# Patient Record
Sex: FEMALE | Race: WHITE
[De-identification: names, ages, dates, MRNs, and addresses within clinical notes are randomized per-mention and may not be internally consistent; named-entity substitution may affect disease eponyms.]

---

## 2023-04-04 PROBLEM — Z00.00 ENCOUNTER FOR PREVENTIVE HEALTH EXAMINATION: Status: ACTIVE | Noted: 2023-04-04

## 2023-04-12 ENCOUNTER — APPOINTMENT (OUTPATIENT)
Dept: ORTHOPEDIC SURGERY | Facility: CLINIC | Age: 68
End: 2023-04-12
Payer: MEDICARE

## 2023-04-12 DIAGNOSIS — E78.00 PURE HYPERCHOLESTEROLEMIA, UNSPECIFIED: ICD-10-CM

## 2023-04-12 DIAGNOSIS — M51.36 OTHER INTERVERTEBRAL DISC DEGENERATION, LUMBAR REGION: ICD-10-CM

## 2023-04-12 DIAGNOSIS — Z90.710 ACQUIRED ABSENCE OF BOTH CERVIX AND UTERUS: ICD-10-CM

## 2023-04-12 DIAGNOSIS — M41.55 OTHER SECONDARY SCOLIOSIS, THORACOLUMBAR REGION: ICD-10-CM

## 2023-04-12 DIAGNOSIS — I10 ESSENTIAL (PRIMARY) HYPERTENSION: ICD-10-CM

## 2023-04-12 PROCEDURE — 72110 X-RAY EXAM L-2 SPINE 4/>VWS: CPT

## 2023-04-12 PROCEDURE — 72170 X-RAY EXAM OF PELVIS: CPT

## 2023-04-12 PROCEDURE — 99204 OFFICE O/P NEW MOD 45 MIN: CPT

## 2023-04-12 NOTE — DISCUSSION/SUMMARY
[de-identified] : reviewed the case/imaging \par degnerative changes noted on the imaging \par lumbar spondylosis with radiculopathy \par indicated for MRi L spine \par fu the mri

## 2023-04-12 NOTE — HISTORY OF PRESENT ILLNESS
[Lower back] : lower back [Gradual] : gradual [Sudden] : sudden [7] : 7 [6] : 6 [Burning] : burning [Shooting] : shooting [Stabbing] : stabbing [Intermittent] : intermittent [Rest] : rest [Exercising] : exercising [Retired] : Work status: retired [de-identified] : 4/12/23: 67yo RHD F with longstanding bilateral lumbar pain for many years with no injury. distant injuries -  Most bothersome with prolonged sitting and prolonged standing. \par \par No recent formal tx to date. \par Was previously seen for this issue by Dr. Gomez in 2019. \par \par Admits to radic down both legs and N/T of bilateral feet. Some weakness \par Denies b/b incontinence.\par distant LESI in 2009\par Has tried chiro care, PT and epidural in the past\par No recent formal tx\par No prior surgery/acupuncture\par nO RECENT pain management \par \par PMHx: HTN, hyperlipidemia, gerd \par Denies cancer hx\par \par Xrays today:\par L spine - lumbar scoliosis, spondylosis (constant from recent abdominal exam)\par AP pelvis - negative \par \par Occupation:  \par working from home now  [] : Post Surgical Visit: no [FreeTextEntry1] : l spine  [FreeTextEntry5] : Pt has a hx of l spine issues, pt has numbness and tingling down into both feet, pt has pain with sitting for long periods of inactivity as well as pain when sleeping  [FreeTextEntry7] : down into both feet  [de-identified] : xrays mris  [de-identified] : none  [de-identified] : 2009

## 2023-04-17 ENCOUNTER — RESULT REVIEW (OUTPATIENT)
Age: 68
End: 2023-04-17

## 2023-05-10 ENCOUNTER — APPOINTMENT (OUTPATIENT)
Dept: ORTHOPEDIC SURGERY | Facility: CLINIC | Age: 68
End: 2023-05-10
Payer: MEDICARE

## 2023-05-10 DIAGNOSIS — M54.16 RADICULOPATHY, LUMBAR REGION: ICD-10-CM

## 2023-05-10 PROCEDURE — 99214 OFFICE O/P EST MOD 30 MIN: CPT

## 2023-05-10 NOTE — DISCUSSION/SUMMARY
[de-identified] : reviewed the MRi with patient \par questions answered \par severe stenosis with spondylolisthesis \par would be a candidate for l2-S1 lami/fusoin \par we discussed this would be a big surgery \par we discussed the surgery \par \par will refer to pain management to try an injection and also rec PT

## 2023-05-10 NOTE — HISTORY OF PRESENT ILLNESS
[Lower back] : lower back [Gradual] : gradual [Sudden] : sudden [7] : 7 [6] : 6 [Burning] : burning [Shooting] : shooting [Stabbing] : stabbing [Intermittent] : intermittent [Rest] : rest [Exercising] : exercising [Retired] : Work status: retired [de-identified] : 4/12/23: 67yo RHD F with longstanding bilateral lumbar pain for many years with no injury. distant injuries -  Most bothersome with prolonged sitting and prolonged standing. \par \par No recent formal tx to date. \par Was previously seen for this issue by Dr. Gomez in 2019. \par \par Admits to radic down both legs and N/T of bilateral feet. Some weakness \par Denies b/b incontinence.\par distant LESI in 2009\par Has tried chiro care, PT and epidural in the past\par No recent formal tx\par No prior surgery/acupuncture\par nO RECENT pain management \par \par PMHx: HTN, hyperlipidemia, gerd \par Denies cancer hx\par \par Xrays today:\par L spine - lumbar scoliosis, spondylosis (constant from recent abdominal exam)\par AP pelvis - negative \par \par Occupation:  \par working from home now \par \par 5/10/23: Here for fu - plan at last was "reviewed the case/imaging \par degnerative changes noted on the imaging \par lumbar spondylosis with radiculopathy \par indicated for MRi L spine \par fu the mri" - overall doing about the same as before - pain continues in the back - numbness in the legs \par \par MRi L spine - \par Mild lumbar dextroscoliosis.\par Advanced multilevel spondylosis with multiple levels of moderate to severe\par spinal canal stenosis most pronounced at L4-L5.\par Severe bilateral neural foraminal narrowing throughout the lumbar spine as\par described.\par \par  [] : Post Surgical Visit: no [FreeTextEntry1] : l spine  [FreeTextEntry7] : down into both feet  [de-identified] : xrays MRI  [de-identified] : none  [de-identified] : 2009

## 2024-10-07 ENCOUNTER — APPOINTMENT (OUTPATIENT)
Dept: OTOLARYNGOLOGY | Facility: CLINIC | Age: 69
End: 2024-10-07
Payer: MEDICARE

## 2024-10-07 VITALS
SYSTOLIC BLOOD PRESSURE: 123 MMHG | HEART RATE: 81 BPM | BODY MASS INDEX: 21.36 KG/M2 | WEIGHT: 99 LBS | DIASTOLIC BLOOD PRESSURE: 73 MMHG | HEIGHT: 57 IN

## 2024-10-07 DIAGNOSIS — Z83.438 FAMILY HISTORY OF OTHER DISORDER OF LIPOPROTEIN METABOLISM AND OTHER LIPIDEMIA: ICD-10-CM

## 2024-10-07 DIAGNOSIS — Z72.0 TOBACCO USE: ICD-10-CM

## 2024-10-07 DIAGNOSIS — H61.21 IMPACTED CERUMEN, RIGHT EAR: ICD-10-CM

## 2024-10-07 DIAGNOSIS — Z82.49 FAMILY HISTORY OF ISCHEMIC HEART DISEASE AND OTHER DISEASES OF THE CIRCULATORY SYSTEM: ICD-10-CM

## 2024-10-07 PROCEDURE — 99202 OFFICE O/P NEW SF 15 MIN: CPT

## 2024-10-07 RX ORDER — OLMESARTAN MEDOXOMIL 40 MG/1
TABLET, FILM COATED ORAL
Refills: 0 | Status: ACTIVE | COMMUNITY

## 2024-10-07 RX ORDER — PANTOPRAZOLE SODIUM 20 MG/1
TABLET, DELAYED RELEASE ORAL
Refills: 0 | Status: ACTIVE | COMMUNITY

## 2024-10-07 RX ORDER — ROSUVASTATIN CALCIUM 5 MG/1
TABLET, FILM COATED ORAL
Refills: 0 | Status: ACTIVE | COMMUNITY

## 2025-02-18 ENCOUNTER — APPOINTMENT (OUTPATIENT)
Dept: ORTHOPEDIC SURGERY | Facility: CLINIC | Age: 70
End: 2025-02-18
Payer: MEDICARE

## 2025-02-18 VITALS
BODY MASS INDEX: 21.28 KG/M2 | HEIGHT: 57.5 IN | SYSTOLIC BLOOD PRESSURE: 122 MMHG | WEIGHT: 100 LBS | DIASTOLIC BLOOD PRESSURE: 83 MMHG | HEART RATE: 88 BPM

## 2025-02-18 DIAGNOSIS — S93.491A SPRAIN OF OTHER LIGAMENT OF RIGHT ANKLE, INITIAL ENCOUNTER: ICD-10-CM

## 2025-02-18 PROCEDURE — 99203 OFFICE O/P NEW LOW 30 MIN: CPT

## 2025-08-04 ENCOUNTER — NON-APPOINTMENT (OUTPATIENT)
Age: 70
End: 2025-08-04

## 2025-08-06 ENCOUNTER — APPOINTMENT (OUTPATIENT)
Dept: OTOLARYNGOLOGY | Facility: CLINIC | Age: 70
End: 2025-08-06
Payer: MEDICARE

## 2025-08-06 VITALS
HEIGHT: 57.5 IN | SYSTOLIC BLOOD PRESSURE: 119 MMHG | WEIGHT: 99 LBS | HEART RATE: 81 BPM | DIASTOLIC BLOOD PRESSURE: 73 MMHG | BODY MASS INDEX: 21.07 KG/M2

## 2025-08-06 DIAGNOSIS — H61.23 IMPACTED CERUMEN, BILATERAL: ICD-10-CM

## 2025-08-06 PROCEDURE — 69210 REMOVE IMPACTED EAR WAX UNI: CPT

## 2025-08-06 PROCEDURE — 99212 OFFICE O/P EST SF 10 MIN: CPT | Mod: 25
